# Patient Record
Sex: FEMALE
[De-identification: names, ages, dates, MRNs, and addresses within clinical notes are randomized per-mention and may not be internally consistent; named-entity substitution may affect disease eponyms.]

---

## 2018-05-03 ENCOUNTER — HOSPITAL ENCOUNTER (OUTPATIENT)
Dept: HOSPITAL 90 - OIH | Age: 78
Discharge: HOME | End: 2018-05-03
Attending: PHYSICIAN ASSISTANT
Payer: MEDICARE

## 2018-05-03 DIAGNOSIS — M19.012: Primary | ICD-10-CM

## 2018-05-03 PROCEDURE — 71100 X-RAY EXAM RIBS UNI 2 VIEWS: CPT

## 2018-06-30 ENCOUNTER — HOSPITAL ENCOUNTER (EMERGENCY)
Dept: HOSPITAL 90 - EDH | Age: 78
Discharge: HOME | End: 2018-06-30
Payer: MEDICARE

## 2018-06-30 DIAGNOSIS — F03.90: ICD-10-CM

## 2018-06-30 DIAGNOSIS — I10: ICD-10-CM

## 2018-06-30 DIAGNOSIS — K43.9: Primary | ICD-10-CM

## 2018-06-30 DIAGNOSIS — E87.6: ICD-10-CM

## 2018-06-30 LAB
ALBUMIN SERPL-MCNC: 3.9 G/DL (ref 3.5–5)
ALT SERPL-CCNC: 22 U/L (ref 12–78)
AST SERPL-CCNC: 22 U/L (ref 10–37)
BASOPHILS NFR BLD AUTO: 0.6 % (ref 0–5)
BILIRUB SERPL-MCNC: 0.5 MG/DL (ref 0.2–1)
BUN SERPL-MCNC: 19 MG/DL (ref 7–18)
CHLORIDE SERPL-SCNC: 105 MMOL/L (ref 101–111)
CK SERPL-CCNC: 70 U/L (ref 21–232)
CO2 SERPL-SCNC: 28 MMOL/L (ref 21–32)
CREAT SERPL-MCNC: 0.6 MG/DL (ref 0.5–1.5)
DEPRECATED SQUAMOUS URNS QL MICRO: (no result) /HPF (ref 0–2)
EOSINOPHIL NFR BLD AUTO: 1.1 % (ref 0–8)
ERYTHROCYTE [DISTWIDTH] IN BLOOD BY AUTOMATED COUNT: 13.1 % (ref 11–15.5)
GFR SERPL CREATININE-BSD FRML MDRD: 103 ML/MIN (ref 60–?)
GLUCOSE SERPL-MCNC: 106 MG/DL (ref 70–105)
HCT VFR BLD AUTO: 37.7 % (ref 36–48)
LIPASE SERPL-CCNC: 220 U/L (ref 114–286)
LYMPHOCYTES NFR SPEC AUTO: 24 % (ref 21–51)
MCH RBC QN AUTO: 31.5 PG (ref 27–33)
MCHC RBC AUTO-ENTMCNC: 34.8 G/DL (ref 32–36)
MCV RBC AUTO: 90.4 FL (ref 79–99)
MONOCYTES NFR BLD AUTO: 7.2 % (ref 3–13)
NEUTROPHILS NFR BLD AUTO: 67.1 % (ref 40–77)
NRBC BLD MANUAL-RTO: 0 % (ref 0–0.19)
PH UR STRIP: 5.5 [PH] (ref 5–8)
PLATELET # BLD AUTO: 199 K/UL (ref 130–400)
POTASSIUM SERPL-SCNC: 3.1 MMOL/L (ref 3.5–5.1)
PROT SERPL-MCNC: 7.5 G/DL (ref 6–8.3)
RBC # BLD AUTO: 4.17 MIL/UL (ref 4–5.5)
RBC #/AREA URNS HPF: (no result) /HPF (ref 0–1)
SODIUM SERPL-SCNC: 141 MMOL/L (ref 136–145)
SP GR UR STRIP: 1.02 (ref 1–1.03)
UROBILINOGEN UR STRIP-MCNC: 1 MG/DL (ref 0.2–1)
WBC # BLD AUTO: 7.3 K/UL (ref 4.8–10.8)
WBC #/AREA URNS HPF: (no result) /HPF (ref 0–1)

## 2018-06-30 PROCEDURE — 36415 COLL VENOUS BLD VENIPUNCTURE: CPT

## 2018-06-30 PROCEDURE — 85025 COMPLETE CBC W/AUTO DIFF WBC: CPT

## 2018-06-30 PROCEDURE — 83690 ASSAY OF LIPASE: CPT

## 2018-06-30 PROCEDURE — 80053 COMPREHEN METABOLIC PANEL: CPT

## 2018-06-30 PROCEDURE — 74176 CT ABD & PELVIS W/O CONTRAST: CPT

## 2018-06-30 PROCEDURE — 82550 ASSAY OF CK (CPK): CPT

## 2018-06-30 PROCEDURE — 84484 ASSAY OF TROPONIN QUANT: CPT

## 2018-06-30 PROCEDURE — 81001 URINALYSIS AUTO W/SCOPE: CPT

## 2018-06-30 PROCEDURE — 93005 ELECTROCARDIOGRAM TRACING: CPT

## 2020-05-05 ENCOUNTER — HOSPITAL ENCOUNTER (EMERGENCY)
Dept: HOSPITAL 90 - EDH | Age: 80
Discharge: HOME | End: 2020-05-05
Payer: MEDICARE
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
## 2020-05-05 DIAGNOSIS — S80.01XA: ICD-10-CM
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
## 2020-05-05 DIAGNOSIS — M19.90: ICD-10-CM

## 2020-05-05 DIAGNOSIS — E11.9: ICD-10-CM

## 2020-05-05 DIAGNOSIS — S90.31XA: ICD-10-CM

## 2020-05-05 DIAGNOSIS — I10: ICD-10-CM

## 2020-05-05 DIAGNOSIS — S00.83XA: Primary | ICD-10-CM

## 2020-05-05 DIAGNOSIS — S10.93XA: ICD-10-CM

## 2020-05-05 DIAGNOSIS — Z88.8: ICD-10-CM

## 2020-05-05 LAB
ALBUMIN SERPL-MCNC: 3.6 G/DL (ref 3.5–5)
ALT SERPL-CCNC: 17 U/L (ref 12–78)
AST SERPL-CCNC: 14 U/L (ref 10–37)
BASOPHILS NFR BLD AUTO: 0.6 % (ref 0–5)
BILIRUB SERPL-MCNC: 0.7 MG/DL (ref 0.2–1)
BUN SERPL-MCNC: 21 MG/DL (ref 7–18)
CHLORIDE SERPL-SCNC: 102 MMOL/L (ref 101–111)
CO2 SERPL-SCNC: 31 MMOL/L (ref 21–32)
CREAT SERPL-MCNC: 0.6 MG/DL (ref 0.5–1.5)
EOSINOPHIL NFR BLD AUTO: 3.1 % (ref 0–8)
ERYTHROCYTE [DISTWIDTH] IN BLOOD BY AUTOMATED COUNT: 13.1 % (ref 11–15.5)
GFR SERPL CREATININE-BSD FRML MDRD: 102 ML/MIN (ref 60–?)
GLUCOSE SERPL-MCNC: 117 MG/DL (ref 70–105)
HCT VFR BLD AUTO: 33.8 % (ref 36–48)
LYMPHOCYTES NFR SPEC AUTO: 25.5 % (ref 21–51)
MCH RBC QN AUTO: 30.7 PG (ref 27–33)
MCHC RBC AUTO-ENTMCNC: 33.4 G/DL (ref 32–36)
MCV RBC AUTO: 91.8 FL (ref 79–99)
MONOCYTES NFR BLD AUTO: 12.8 % (ref 3–13)
NEUTROPHILS NFR BLD AUTO: 57.8 % (ref 40–77)
NRBC BLD MANUAL-RTO: 0 % (ref 0–0.19)
PLATELET # BLD AUTO: 238 K/UL (ref 130–400)
POTASSIUM SERPL-SCNC: 3.3 MMOL/L (ref 3.5–5.1)
PROT SERPL-MCNC: 6.9 G/DL (ref 6–8.3)
RBC # BLD AUTO: 3.68 MIL/UL (ref 4–5.5)
SODIUM SERPL-SCNC: 137 MMOL/L (ref 136–145)
WBC # BLD AUTO: 4.8 K/UL (ref 4.8–10.8)

## 2020-05-05 PROCEDURE — 85025 COMPLETE CBC W/AUTO DIFF WBC: CPT

## 2020-05-05 PROCEDURE — 36415 COLL VENOUS BLD VENIPUNCTURE: CPT

## 2020-05-05 PROCEDURE — 73630 X-RAY EXAM OF FOOT: CPT

## 2020-05-05 PROCEDURE — 80053 COMPREHEN METABOLIC PANEL: CPT

## 2020-05-05 PROCEDURE — 73562 X-RAY EXAM OF KNEE 3: CPT

## 2020-05-05 PROCEDURE — 70450 CT HEAD/BRAIN W/O DYE: CPT

## 2020-05-05 PROCEDURE — 72170 X-RAY EXAM OF PELVIS: CPT

## 2025-03-16 ENCOUNTER — HOSPITAL ENCOUNTER (EMERGENCY)
Dept: HOSPITAL 90 - EDH | Age: 85
Discharge: HOME | End: 2025-03-16
Payer: MEDICARE

## 2025-03-16 VITALS
RESPIRATION RATE: 20 BRPM | HEART RATE: 81 BPM | DIASTOLIC BLOOD PRESSURE: 81 MMHG | TEMPERATURE: 98.3 F | OXYGEN SATURATION: 98 % | SYSTOLIC BLOOD PRESSURE: 147 MMHG

## 2025-03-16 VITALS — HEIGHT: 60 IN | WEIGHT: 119.93 LBS | BODY MASS INDEX: 23.55 KG/M2

## 2025-03-16 DIAGNOSIS — M54.50: ICD-10-CM

## 2025-03-16 DIAGNOSIS — G89.29: Primary | ICD-10-CM

## 2025-03-16 PROCEDURE — 96372 THER/PROPH/DIAG INJ SC/IM: CPT

## 2025-03-16 PROCEDURE — 99283 EMERGENCY DEPT VISIT LOW MDM: CPT

## 2025-03-16 RX ADMIN — ORPHENADRINE CITRATE ONE MG: 30 INJECTION INTRAMUSCULAR; INTRAVENOUS at 17:02

## 2025-03-16 NOTE — ERN
General


Chief Complaint:  Back Pain or Injury


Stated Complaint:  BACK PAIN


Time Seen by MD:  15:19


Source:  patient, family





History of Present Illness


Initial Comments


PATIENT IS A AN 84-YEAR-OLD FEMALE COMING IN TO BE EVALUATED SO SHE HAD A FALL 

TWO DAYS AGO.  PER FAMILY MEMBER PATIENT FELL DOWN IN INITIALLY WAS COMPLAINING 

OF BACK PAIN.  IN TRIAGE PATIENT STATES HER PAIN HAS GONE AWAY AND STATES SHE 

FEELS FINE.


Allergies:  


Coded Allergies:  


     hydroxychloroquine (Unverified  Allergy, Unknown, 8/21/18)


Uncoded Allergies:  


     I.V. CONTRAST (Allergy, Intermediate, REDNESS, 2/15/23)


   RASH ITCHING


Home Meds


No Active Prescriptions or Reported Meds





Past Medical History


Past Medical History:  No Pertinent History


Past Surgical History:  None, Unknown





Family History


Family History:  Negative





Social History


Social History:  Negative, Lives in Nursing Home





ROS Dictation


CONSTITUTIONAL:  NO CHILLS, NO FEVER, NO WEAKNESS, NO DIAPHORESIS, NO MALAISE.


HEAD/FACE:  NO SIGNS OF TRAUMA. 


EENT:  NO EYE PAIN, NO BLURRED VISION, NO TEARING, NO DOUBLE VISION, NO EAR 

PAIN, NO EAR DISCHARGE, NO NOSE PAIN, NO NASAL CONGESTION, NO THROAT PAIN, NO 

THROAT SWELLING, NO MOUTH PAIN. 


RESPIRATORY:  NO COUGH, NO ORTHOPNEA, NO SOB, NO STRIDOR, NO WHEEZING. 


CARDIOVASCULAR:  NO CHEST PAIN, NO EDEMA, NO PALPITATIONS, NO SYNCOPE. 


GASTROINTESTINAL/ABDOMINAL:   NO ABDOMINAL PAIN, NO CONSTIPATION, NO DIARRHEA, 

NO NAUSEA, NO VOMITING. 


GENITOURINARY:  NO ABNORMAL DISCHARGE, NO DYSURIA, NO FREQUENT URINATION, NO 

HEMATURIA. NO COMPLAINTS OF PAIN IN THE GENITALS. 


MUSCULOSKELETAL:  NO BACK PAIN, NO GOUT, NO JOINT PAIN, NO JOINT SWELLING, NO 

MUSCLE PAIN, NO MUSCLE STIFFNESS, NO NECK PAIN. 


INTEGUMENTARY:  NO CHANGE IN COLOR, NO CHANGE IN HAIR/NAILS, NO DRYNESS, NO 

LESION, NO LUMPS, NO RASH. 


NEUROLOGICAL/PSYCH:  NO ANXIETY, NOT DEPRESSED, NO EMOTIONAL PROBLEM, NO 

HEADACHE, NO NUMBNESS, NO PRE-EXISTING DEFICIT, NO HISTORY OF SEIZURES,  NO 

TREMORS, NO WEAKNESS. 


HEMATOLOGIC/LYMPHATIC:  NOT ANEMIC, NO HISTORY OF BLOOD CLOTS, NO APPARENT 

BLEEDING, NO BRUISING, GLANDS NOT SWOLLEN.


ALL SYSTEMS NEGATIVE, EXCEPT AS NOTED.





Physical Exam


Physical Exam Dictation


VITAL SIGNS:  REVIEWED. 


GENERAL APPEARANCE:  ALERT, ORIENTED X3, NO ACUTE DISTRESS, OBESE.


HEAD AND FACE: NON-TRAUMATIC. 


EYES:   PERRL, PINK CONJUNCTIVAS, EYELID NO TRAUMA, ANTERIOR CHAMBER CLEAR. 


EARS:  PINNAS INTACT AND NO SIGNS OF TRAUMA OR ERYTHEMA.  EAR CANALS CLEAR AND 

NO DISCHARGE. TMS NO ERYTHEMA. 


NOSE:  NO DISCHARGE, NO BLEEDING. 


OROPHARYNX:  MOUTH NORMAL, TEETH NO CARIES, TONGUE PINK.  PHARYNX CLEAR, NO E

RYTHEMA.  TONSILS NO EXUDATES, NO ABSCESSES NOTED. MUCOUS MEMBRANE MOIST.


NECK:  SUPPLE, NON-TENDER, NO THYROMEGALY, NO MASSES, NO JVD, NO BRUITS. 


BREAST:  DEFERRED.


CHEST:   NO TENDERNESS, NO CREPITUS, NO PARADOXICAL MOVEMENT, NO RETRACTIONS.


LUNGS:  CLEAR, WELL-VENTILATED, SYMMETRIC, NO RALES, NO WHEEZING, NO RHONCHI, NO

STRIDOR, GOOD BREATH SOUNDS BILATERALLY. 


HEART:  REGULAR RATE, REGULAR RHYTHM, NO MURMUR, NO GALLOPS. 


VASCULAR: NO PERIPHERAL EDEMA.


ABDOMEN: SOFT, POSITIVE BOWEL SOUNDS, NONDISTENDED, NO GUARDING, NONTENDER, NO 

REBOUND, NO MASSES NO HEPATOMEGALY, NO SPLENOMEGALY, NO HELLER'S SIGN, NO 

HERNIAS. 


RECTAL: DEFERRED. 


GENITAL:  DEFERRED. 


NEUROLOGICAL:  NORMAL SPEECH, GROSS MOTOR FUNCTION INTACT, GROSS SENSORY 

FUNCTION INTACT.


MUSCULOSKELETAL:  NECK NONTENDER, FULL RANGE OF MOTION, BACK NONTENDER, FULL 

RANGE OF MOTION.


EXTREMITIES: NONTENDER, FULL RANGE OF MOTION. 


SKIN:  COLOR PINK, DRY, NO TURGOR, NO RASH, NO LACERATIONS, NO ABRASIONS, NO 

CONTUSIONS.


LYMPHATICS:  DEFERRED.





MDM


MDM: 


DIFFERENTIAL DIAGNOSIS:  FALL, WELLNESS EXAM,


PATIENT IS A AN 84-YEAR-OLD FEMALE COMING IN FAMILY MEMBER DUE TO BACK PAIN.  

DURING TRIAGE EVALUATION PATIENT STATES HER PAIN HAS, WAS COMPLETELY RESOLVED . 

PATIENT WILL BE DISCHARGED IN STABLE CONDITION WITH A DIAGNOSIS OF CHRONIC BACK 

PAIN.  PER FAMILY MEMBER PATIENT WAS RESTLESS STATES HE WANTS TO LEAVE.  I DID 

ADVISED HIM IF THE PAIN RESURFACE IN HIS SO SHE NEEDS IMMEDIATE HELP THE SEEK 

IMMEDIATE HELP WITH THE NEAREST ER OR WITH PCP.


ED Course





Orders








Procedure Category Date Status





  Time 


 


Orphenadrine Citrate PHA 3/16/25 Complete





 (Norflex)  17:00 








Current Medications








 Medications


  (Trade)  Dose


 Ordered  Sig/[place]


 Route


 PRN Reason  Start Time


 Stop Time Status Last Admin


Dose Admin


 


 Orphenadrine


 Citrate


  (Norflex)  60 mg  ONCE  ONCE


 IM


   3/16/25 17:00


 3/16/25 17:01 DC 3/16/25 17:02











Vital Signs








  Date Time  Temp Pulse Resp B/P (MAP) Pulse Ox O2 Delivery O2 Flow Rate FiO2


 


3/16/25 16:13 99.0 78 18 150/110 0 Room Air  











DX & DISP


Disposition:  Discharge


Departure


Impression:  


   Primary Impression:  Chronic back pain


   Additional Impression:  Wellness examination


Condition:  Stable


Scripts


No Active Prescriptions or Reported Meds





Additional Instructions:  


FOLLOW-UP WITH PRIMARY CARE PROVIDER IN 1 TO 2 DAYS.  TAKE MEDICATIONS AS DIREC

FADI HERE IN THE EMERGENCY ROOM.  OKAY TO CONTINUE HOME MEDICATIONS UNLESS 

OTHERWISE DISCUSSED DURING YOUR VISIT IN THE EMERGENCY ROOM TODAY.  RETURN TO 

YOUR NEAREST EMERGENCY ROOM IF SYMPTOMS WORSEN OR IF THERE IS NO IMPROVEMENT.  

CALL 911 IF YOU NEED IMMEDIATE ASSISTANCE.  TAKE TYLENOL  OVER-THE-COUNTER AS 

NEEDED AND IF NO CONTRAINDICATIONS ARE PRESENT.  INCREASE ORAL HYDRATION.  A 

WOUND CULTURE OR URINE CULTURE WAS ORDERED HERE IN THE EMERGENCY ROOM DEPARTMENT

PLEASE FOLLOW-UP WITH PRIMARY CARE PROVIDER AND ADVISE THEM TO GET REPEAT PORTS 

FROM OUR FACILITY.  IF YOU HAD ANY ACE WRAP/SPLINTS THAT WERE APPLIED HERE, 

PLEASE DO NOT REMOVE THEM UNTIL YOU SEE YOUR PRIMARY CARE OR SPECIALTY.





REFERRALS:


Referrals:  


CAMILLA VARGAS (PCP)


Time of Disposition:  17:20











SANDY ASIF MD              Mar 16, 2025 17:11